# Patient Record
Sex: MALE | URBAN - METROPOLITAN AREA
[De-identification: names, ages, dates, MRNs, and addresses within clinical notes are randomized per-mention and may not be internally consistent; named-entity substitution may affect disease eponyms.]

---

## 2018-12-20 ENCOUNTER — APPOINTMENT (RX ONLY)
Age: 28
Setting detail: DERMATOLOGY
End: 2018-12-20

## 2018-12-20 DIAGNOSIS — K13.0 DISEASES OF LIPS: ICD-10-CM

## 2018-12-20 DIAGNOSIS — T07XXXA ABRASION OR FRICTION BURN OF OTHER, MULTIPLE, AND UNSPECIFIED SITES, WITHOUT MENTION OF INFECTION: ICD-10-CM

## 2018-12-20 DIAGNOSIS — D22 MELANOCYTIC NEVI: ICD-10-CM

## 2018-12-20 PROBLEM — D22.39 MELANOCYTIC NEVI OF OTHER PARTS OF FACE: Status: ACTIVE | Noted: 2018-12-20

## 2018-12-20 PROBLEM — T14.8XXA OTHER INJURY OF UNSPECIFIED BODY REGION, INITIAL ENCOUNTER: Status: ACTIVE | Noted: 2018-12-20

## 2018-12-20 PROCEDURE — ? COUNSELING

## 2018-12-20 PROCEDURE — 99203 OFFICE O/P NEW LOW 30 MIN: CPT

## 2018-12-20 PROCEDURE — ? OBSERVATION

## 2018-12-20 PROCEDURE — ? DIAGNOSIS COMMENT

## 2018-12-20 PROCEDURE — ? OTHER

## 2018-12-20 ASSESSMENT — LOCATION ZONE DERM
LOCATION ZONE: FACE
LOCATION ZONE: LIP

## 2018-12-20 ASSESSMENT — LOCATION SIMPLE DESCRIPTION DERM
LOCATION SIMPLE: RIGHT FOREHEAD
LOCATION SIMPLE: LEFT LIP
LOCATION SIMPLE: RIGHT LIP

## 2018-12-20 ASSESSMENT — LOCATION DETAILED DESCRIPTION DERM
LOCATION DETAILED: RIGHT INFERIOR VERMILION LIP
LOCATION DETAILED: LEFT INFERIOR VERMILION LIP
LOCATION DETAILED: RIGHT MEDIAL FOREHEAD

## 2018-12-20 NOTE — PROCEDURE: OTHER
Detail Level: Zone
Note Text (......Xxx Chief Complaint.): This diagnosis correlates with the
Other (Free Text): - advised to use aquaphor and sunscreen
Other (Free Text): Overall, the nevi are dark, but appear benign. Photos taken given some mild clinical atypia and he should RTC in one year to recheck, sooner if he notes any changes.

## 2018-12-20 NOTE — PROCEDURE: OBSERVATION
Size Of Lesion In Cm (Optional): 0
Body Location Override (Optional - Billing Will Still Be Based On Selected Body Map Location If Applicable): face, chest, back
Detail Level: Detailed

## 2018-12-20 NOTE — HPI: FREE FORM (INITIAL HISTORY)
How Severe Is Your Skin Condition? (The Patient Describes The Severity Level As....): mild
What Brings You In Today? (This Is An Xx Year Old Patient Who Presents For...): moles
When Did You First Notice It? (The Patient First Noticed It...): years ago
Where On Your Body Is It? (Located On...): face, forehead
Any Associated Symptoms? (The Symptoms Include.....): darkening, getting more moles, some moles are slowly enlarging.
What Previous Treatments Have You Tried? (The Patient Has Tried The Following Treatments...): no previous treatment
Did Anything Happen To Make You Want To Come In For This Specifically Today? (The Specific Reason That The Patient Came In Today Was Because:): evaluation and management. He was referred by Dr. Jeyson Trinidad. \\n\\Blair has had dry skin on lips x years. Uses multiple different types of lip balms.\\Blair also had an abrasion on his face last week. Wonders about any specific treatment to prevent scarring.

## 2018-12-20 NOTE — PROCEDURE: DIAGNOSIS COMMENT
Detail Level: Simple
Comment: mild erythema on left side on nose from a scrape last week. Healing appropriately. No evidence of infection. He should use Aquaphor for 1-2 weeks, then sun screen daily. He works in [place] (parents live on [place].)

## 2019-05-23 ENCOUNTER — APPOINTMENT (RX ONLY)
Age: 29
Setting detail: DERMATOLOGY
End: 2019-05-23

## 2019-05-23 DIAGNOSIS — L91.8 OTHER HYPERTROPHIC DISORDERS OF THE SKIN: ICD-10-CM

## 2019-05-23 DIAGNOSIS — D22 MELANOCYTIC NEVI: ICD-10-CM

## 2019-05-23 DIAGNOSIS — K13.0 DISEASES OF LIPS: ICD-10-CM

## 2019-05-23 PROBLEM — D22.39 MELANOCYTIC NEVI OF OTHER PARTS OF FACE: Status: ACTIVE | Noted: 2019-05-23

## 2019-05-23 PROBLEM — D22.5 MELANOCYTIC NEVI OF TRUNK: Status: ACTIVE | Noted: 2019-05-23

## 2019-05-23 PROCEDURE — ? OTHER

## 2019-05-23 PROCEDURE — 99213 OFFICE O/P EST LOW 20 MIN: CPT

## 2019-05-23 PROCEDURE — ? COUNSELING

## 2019-05-23 PROCEDURE — ? SKIN TAG REMOVAL (COSMETIC)

## 2019-05-23 PROCEDURE — ? DIAGNOSIS COMMENT

## 2019-05-23 ASSESSMENT — LOCATION SIMPLE DESCRIPTION DERM
LOCATION SIMPLE: LEFT UPPER ARM
LOCATION SIMPLE: RIGHT FOREHEAD
LOCATION SIMPLE: LEFT LIP
LOCATION SIMPLE: UPPER BACK
LOCATION SIMPLE: RIGHT INFERIOR EYELID

## 2019-05-23 ASSESSMENT — LOCATION ZONE DERM
LOCATION ZONE: TRUNK
LOCATION ZONE: ARM
LOCATION ZONE: LIP
LOCATION ZONE: FACE
LOCATION ZONE: EYELID

## 2019-05-23 ASSESSMENT — LOCATION DETAILED DESCRIPTION DERM
LOCATION DETAILED: LEFT INFERIOR VERMILION LIP
LOCATION DETAILED: LEFT ANTERIOR MEDIAL PROXIMAL UPPER ARM
LOCATION DETAILED: RIGHT LATERAL INFERIOR EYELID
LOCATION DETAILED: SUPERIOR THORACIC SPINE
LOCATION DETAILED: RIGHT MEDIAL FOREHEAD

## 2019-05-23 NOTE — PROCEDURE: DIAGNOSIS COMMENT
Detail Level: Simple
Comment: Nevi appear benign and there have been no changes from previous photos. Discussed importance of sun protection. Follow-up annually or sooner for any new or changing lesions.
Comment: present x years. Currently using [employer] chap stick. Samples of Dr. Barcenas's lip emollient given. Also discussed a trial of desonide or 2.5% HC ointment. He will call for Rx if needed. Avoid licking lips and protect lips from sun.

## 2019-05-23 NOTE — PROCEDURE: OTHER
Note Text (......Xxx Chief Complaint.): This diagnosis correlates with the
Detail Level: Zone
Other (Free Text): --he has an additional smaller skin tag under the right eye which was not treated

## 2019-05-23 NOTE — HPI: FREE FORM (INITIAL HISTORY)
How Severe Is Your Skin Condition? (The Patient Describes The Severity Level As....): mild
What Brings You In Today? (This Is An Xx Year Old Patient Who Presents For...): skin tags
When Did You First Notice It? (The Patient First Noticed It...): years ago
Where On Your Body Is It? (Located On...): under right eye and left armpit
Any Associated Symptoms? (The Symptoms Include.....): catching on clothing (armpit), enlarging
What Previous Treatments Have You Tried? (The Patient Has Tried The Following Treatments...): no previous treatment
Did Anything Happen To Make You Want To Come In For This Specifically Today? (The Specific Reason That The Patient Came In Today Was Because:): evaluation and management

## 2019-05-23 NOTE — PROCEDURE: SKIN TAG REMOVAL (COSMETIC)
Anesthesia Volume In Cc: 0
Detail Level: Simple
Removed With: scissors
Consent: Verbal consent obtained and the risks of skin tag removal was reviewed with the patient including but not limited to bleeding, pigmentary change, infection, pain, and remote possibility of scarring.
Removed With: liquid nitrogen

## 2022-03-31 ENCOUNTER — APPOINTMENT (RX ONLY)
Age: 32
Setting detail: DERMATOLOGY
End: 2022-03-31

## 2022-03-31 DIAGNOSIS — B07.8 OTHER VIRAL WARTS: ICD-10-CM

## 2022-03-31 DIAGNOSIS — D22 MELANOCYTIC NEVI: ICD-10-CM

## 2022-03-31 PROBLEM — D22.4 MELANOCYTIC NEVI OF SCALP AND NECK: Status: ACTIVE | Noted: 2022-03-31

## 2022-03-31 PROBLEM — D22.5 MELANOCYTIC NEVI OF TRUNK: Status: ACTIVE | Noted: 2022-03-31

## 2022-03-31 PROBLEM — D22.39 MELANOCYTIC NEVI OF OTHER PARTS OF FACE: Status: ACTIVE | Noted: 2022-03-31

## 2022-03-31 PROCEDURE — ? LIQUID NITROGEN

## 2022-03-31 PROCEDURE — 17110 DESTRUCTION B9 LES UP TO 14: CPT

## 2022-03-31 PROCEDURE — ? OTHER

## 2022-03-31 PROCEDURE — 99212 OFFICE O/P EST SF 10 MIN: CPT | Mod: 25

## 2022-03-31 PROCEDURE — ? COUNSELING

## 2022-03-31 ASSESSMENT — LOCATION SIMPLE DESCRIPTION DERM
LOCATION SIMPLE: NECK
LOCATION SIMPLE: LEFT CHEEK
LOCATION SIMPLE: LEFT UPPER BACK
LOCATION SIMPLE: RIGHT FOREHEAD

## 2022-03-31 ASSESSMENT — LOCATION DETAILED DESCRIPTION DERM
LOCATION DETAILED: LEFT INFERIOR UPPER BACK
LOCATION DETAILED: RIGHT SUPERIOR MEDIAL FOREHEAD
LOCATION DETAILED: LEFT INFERIOR CENTRAL MALAR CHEEK
LOCATION DETAILED: RIGHT SUPERIOR LATERAL NECK

## 2022-03-31 ASSESSMENT — LOCATION ZONE DERM
LOCATION ZONE: TRUNK
LOCATION ZONE: FACE
LOCATION ZONE: NECK

## 2022-03-31 NOTE — PROCEDURE: OTHER
Render Risk Assessment In Note?: no
Other (Free Text): Lesion is somewhat filiform\\nHe was given [facility] handout on post-liquid nitrogen wound care
Detail Level: Simple
Note Text (......Xxx Chief Complaint.): This diagnosis correlates with the

## 2022-03-31 NOTE — PROCEDURE: LIQUID NITROGEN
Post-Care Instructions: Patient was given [facility] Handout on post-liquid nitrogen wound care.
Show Applicator Variable?: Yes
Detail Level: Simple
Consent: The patient's consent was obtained including but not limited to risks of crusting, scabbing, blistering, scarring, darker or lighter pigmentary change, recurrence, incomplete removal and infection. The patient agrees to f/u if the lesion does not resolve with this treatment.
Medical Necessity Clause: This procedure was medically necessary because the lesion that was treated was:
Include Z78.9 (Other Specified Conditions Influencing Health Status) As An Associated Diagnosis?: No
Medical Necessity Information: It is in your best interest to select a reason for this procedure from the list below. All of these items fulfill various CMS LCD requirements except the new and changing color options.
Spray Paint Text: The liquid nitrogen was applied to the skin utilizing a spray paint frosting technique.

## 2022-03-31 NOTE — HPI: SKIN LESION (SKIN TAGS)
How Severe Are They?: moderate
Is This A New Presentation, Or A Follow-Up?: Skin Lesion
Additional History: Patient is also here for a skin exam, above the waist.

## 2022-09-08 ENCOUNTER — APPOINTMENT (RX ONLY)
Age: 32
Setting detail: DERMATOLOGY
End: 2022-09-08

## 2022-09-08 DIAGNOSIS — B07.8 OTHER VIRAL WARTS: ICD-10-CM

## 2022-09-08 DIAGNOSIS — D22 MELANOCYTIC NEVI: ICD-10-CM

## 2022-09-08 DIAGNOSIS — D49.2 NEOPLASM OF UNSPECIFIED BEHAVIOR OF BONE, SOFT TISSUE, AND SKIN: ICD-10-CM

## 2022-09-08 PROBLEM — D22.39 MELANOCYTIC NEVI OF OTHER PARTS OF FACE: Status: ACTIVE | Noted: 2022-09-08

## 2022-09-08 PROBLEM — D22.4 MELANOCYTIC NEVI OF SCALP AND NECK: Status: ACTIVE | Noted: 2022-09-08

## 2022-09-08 PROBLEM — D22.5 MELANOCYTIC NEVI OF TRUNK: Status: ACTIVE | Noted: 2022-09-08

## 2022-09-08 PROCEDURE — ? COUNSELING

## 2022-09-08 PROCEDURE — 11102 TANGNTL BX SKIN SINGLE LES: CPT | Mod: 59

## 2022-09-08 PROCEDURE — ? OTHER

## 2022-09-08 PROCEDURE — ? BIOPSY BY SHAVE METHOD

## 2022-09-08 PROCEDURE — 17110 DESTRUCTION B9 LES UP TO 14: CPT

## 2022-09-08 PROCEDURE — ? LIQUID NITROGEN

## 2022-09-08 PROCEDURE — 99212 OFFICE O/P EST SF 10 MIN: CPT | Mod: 25

## 2022-09-08 ASSESSMENT — LOCATION SIMPLE DESCRIPTION DERM
LOCATION SIMPLE: LEFT UPPER BACK
LOCATION SIMPLE: RIGHT FOREHEAD
LOCATION SIMPLE: LEFT CHEEK
LOCATION SIMPLE: NECK

## 2022-09-08 ASSESSMENT — LOCATION DETAILED DESCRIPTION DERM
LOCATION DETAILED: RIGHT SUPERIOR LATERAL NECK
LOCATION DETAILED: LEFT CENTRAL LATERAL NECK
LOCATION DETAILED: LEFT INFERIOR UPPER BACK
LOCATION DETAILED: RIGHT SUPERIOR MEDIAL FOREHEAD
LOCATION DETAILED: LEFT INFERIOR CENTRAL MALAR CHEEK

## 2022-09-08 ASSESSMENT — LOCATION ZONE DERM
LOCATION ZONE: NECK
LOCATION ZONE: FACE
LOCATION ZONE: TRUNK

## 2022-09-08 NOTE — HPI: SKIN LESION (SKIN TAGS)
How Severe Are They?: moderate
Is This A New Presentation, Or A Follow-Up?: Skin Lesions
Additional History: Pt is also here to have moles on his face evaluated.

## 2022-09-08 NOTE — PROCEDURE: LIQUID NITROGEN
Spray Paint Technique: No
Post-Care Instructions: Patient was given [facility] Handout on post-liquid nitrogen wound care.
Medical Necessity Clause: This procedure was medically necessary because the lesion that was treated was:
Spray Paint Text: The liquid nitrogen was applied to the skin utilizing a spray paint frosting technique.
Show Aperture Variable?: Yes
Consent: The patient's consent was obtained including but not limited to risks of crusting, scabbing, blistering, scarring, darker or lighter pigmentary change, recurrence, incomplete removal and infection. The patient agrees to f/u if the lesion does not resolve with this treatment.
Medical Necessity Information: It is in your best interest to select a reason for this procedure from the list below. All of these items fulfill various CMS LCD requirements except the new and changing color options.
Detail Level: Simple

## 2022-09-08 NOTE — PROCEDURE: OTHER
Render Risk Assessment In Note?: no
Other (Free Text): Lesion resolved after previous LN2 but recurred 1-2 months later. We decided to treat with LN2 one more time. If it recurs again, will shave biopsy
Note Text (......Xxx Chief Complaint.): This diagnosis correlates with the
Detail Level: Simple

## 2022-09-08 NOTE — PROCEDURE: BIOPSY BY SHAVE METHOD
Anesthesia Type: 1% lidocaine with epinephrine
Dressing: pressure dressing with telfa
Validate That Anesthesia Is Not 0: No
Consent: Verbal consent was obtained and risks were reviewed including but not limited to scarring, infection, bleeding, scabbing, incomplete removal, nerve damage and allergy to anesthesia.
Biopsy Method: Personna blade
Depth Of Biopsy: dermis
Size Of Lesion In Cm: 0
Detail Level: Simple
Billing Type: Third-Party Bill
Type Of Destruction Used: Electrodesiccation and Curettage
Notification Instructions: Patient will be notified of biopsy results. However, patient instructed to call the office if not contacted within 2 weeks.
Electrodesiccation And Curettage Text: The wound bed was treated with electrodesiccation and curettage after the biopsy was performed.
Validate Note Data (See Information Below): Yes
Body Location Override (Optional - Billing Will Still Be Based On Selected Body Map Location If Applicable): left neck
Wound Care: Aquaphor
Post-Care Instructions: Patient given \"[facility]\" handout for post care instructions.
Biopsy Type: H and E
Electrodesiccation Text: The wound bed was treated with electrodesiccation after the biopsy was performed.
Information: Selecting Yes will display possible errors in your note based on the variables you have selected. This validation is only offered as a suggestion for you. PLEASE NOTE THAT THE VALIDATION TEXT WILL BE REMOVED WHEN YOU FINALIZE YOUR NOTE. IF YOU WANT TO FAX A PRELIMINARY NOTE YOU WILL NEED TO TOGGLE THIS TO 'NO' IF YOU DO NOT WANT IT IN YOUR FAXED NOTE.
Silver Nitrate Text: The wound bed was treated with silver nitrate after the biopsy was performed.
Anesthesia Volume In Cc: 1
Cryotherapy Text: The wound bed was treated with cryotherapy after the biopsy was performed.
Hemostasis: Drysol

## 2024-03-21 ENCOUNTER — APPOINTMENT (RX ONLY)
Age: 34
Setting detail: DERMATOLOGY
End: 2024-03-21

## 2024-03-21 DIAGNOSIS — D22 MELANOCYTIC NEVI: ICD-10-CM

## 2024-03-21 DIAGNOSIS — L90.5 SCAR CONDITIONS AND FIBROSIS OF SKIN: ICD-10-CM

## 2024-03-21 DIAGNOSIS — L82.1 OTHER SEBORRHEIC KERATOSIS: ICD-10-CM

## 2024-03-21 DIAGNOSIS — K13.0 DISEASES OF LIPS: ICD-10-CM

## 2024-03-21 DIAGNOSIS — L91.8 OTHER HYPERTROPHIC DISORDERS OF THE SKIN: ICD-10-CM

## 2024-03-21 DIAGNOSIS — L20.89 OTHER ATOPIC DERMATITIS: ICD-10-CM

## 2024-03-21 PROBLEM — D22.5 MELANOCYTIC NEVI OF TRUNK: Status: ACTIVE | Noted: 2024-03-21

## 2024-03-21 PROBLEM — D22.39 MELANOCYTIC NEVI OF OTHER PARTS OF FACE: Status: ACTIVE | Noted: 2024-03-21

## 2024-03-21 PROCEDURE — ? COUNSELING

## 2024-03-21 PROCEDURE — ? DIAGNOSIS COMMENT

## 2024-03-21 PROCEDURE — 99213 OFFICE O/P EST LOW 20 MIN: CPT

## 2024-03-21 PROCEDURE — ? PATIENT SPECIFIC COUNSELING

## 2024-03-21 ASSESSMENT — LOCATION SIMPLE DESCRIPTION DERM
LOCATION SIMPLE: RIGHT INDEX FINGER
LOCATION SIMPLE: LEFT FOREHEAD
LOCATION SIMPLE: INFERIOR FOREHEAD
LOCATION SIMPLE: RIGHT ZYGOMA
LOCATION SIMPLE: LEFT CHEEK
LOCATION SIMPLE: RIGHT LIP
LOCATION SIMPLE: LEFT UPPER BACK
LOCATION SIMPLE: RIGHT FOREHEAD

## 2024-03-21 ASSESSMENT — LOCATION DETAILED DESCRIPTION DERM
LOCATION DETAILED: INFERIOR MID FOREHEAD
LOCATION DETAILED: RIGHT CENTRAL ZYGOMA
LOCATION DETAILED: LEFT MID-UPPER BACK
LOCATION DETAILED: RIGHT PROXIMAL DORSAL INDEX FINGER
LOCATION DETAILED: LEFT INFERIOR FOREHEAD
LOCATION DETAILED: RIGHT INFERIOR VERMILION LIP
LOCATION DETAILED: RIGHT SUPERIOR MEDIAL FOREHEAD
LOCATION DETAILED: LEFT LATERAL MALAR CHEEK

## 2024-03-21 ASSESSMENT — LOCATION ZONE DERM
LOCATION ZONE: FINGER
LOCATION ZONE: TRUNK
LOCATION ZONE: LIP
LOCATION ZONE: FACE

## 2024-03-21 NOTE — PROCEDURE: PATIENT SPECIFIC COUNSELING
recommended sun screen and ok to continue silicone gel. Will likely improve with time as well. Overall, scar is fairly mild. Cosmetic contact information given, in case he would like to inquire further about possible additional treatment for the scar
Detail Level: Zone
he just picked up a refill of triamcinolone from his PCP and will use as needed. Discussed use of hand emollients as well

## 2024-03-21 NOTE — HPI: SCAR
Is This A New Presentation, Or A Follow-Up?: Scar
Additional History: Pt hit head on corner in house in July 2023.

## 2024-03-21 NOTE — PROCEDURE: DIAGNOSIS COMMENT
Comment: on several fingers
Detail Level: Zone
Comment: better with emollients
Render Risk Assessment In Note?: no
Comment: thin, linear, scar. Not erythematous or hypertrophic